# Patient Record
Sex: MALE | Race: WHITE | NOT HISPANIC OR LATINO | URBAN - METROPOLITAN AREA
[De-identification: names, ages, dates, MRNs, and addresses within clinical notes are randomized per-mention and may not be internally consistent; named-entity substitution may affect disease eponyms.]

---

## 2023-08-30 RX ORDER — LOSARTAN POTASSIUM 100 MG/1
1 TABLET ORAL DAILY
COMMUNITY
Start: 2020-10-01 | End: 2024-02-27 | Stop reason: SDUPTHER

## 2023-08-30 RX ORDER — ALBUTEROL SULFATE 90 UG/1
AEROSOL, METERED RESPIRATORY (INHALATION)
COMMUNITY
Start: 2022-07-26 | End: 2024-03-26 | Stop reason: ALTCHOICE

## 2023-08-30 RX ORDER — OMEPRAZOLE 40 MG/1
1 CAPSULE, DELAYED RELEASE ORAL DAILY
COMMUNITY
Start: 2020-01-29 | End: 2024-03-26 | Stop reason: ALTCHOICE

## 2023-08-30 RX ORDER — ATENOLOL 25 MG/1
25 TABLET ORAL DAILY
COMMUNITY
End: 2024-02-27 | Stop reason: SDUPTHER

## 2023-08-30 RX ORDER — FLUTICASONE PROPIONATE 50 MCG
2 SPRAY, SUSPENSION (ML) NASAL DAILY
COMMUNITY
Start: 2022-07-26 | End: 2024-03-26 | Stop reason: ALTCHOICE

## 2023-08-30 RX ORDER — SERTRALINE HYDROCHLORIDE 100 MG/1
150 TABLET, FILM COATED ORAL DAILY
COMMUNITY
End: 2024-03-26 | Stop reason: SDUPTHER

## 2023-08-30 RX ORDER — CETIRIZINE HYDROCHLORIDE 10 MG/1
1 TABLET ORAL DAILY
COMMUNITY
End: 2024-03-26 | Stop reason: ALTCHOICE

## 2023-09-25 RX ORDER — SERTRALINE HYDROCHLORIDE 100 MG/1
150 TABLET, FILM COATED ORAL DAILY
Qty: 135 TABLET | Refills: 3 | OUTPATIENT
Start: 2023-09-25

## 2024-02-17 DIAGNOSIS — I10 PRIMARY HYPERTENSION: ICD-10-CM

## 2024-02-19 RX ORDER — ATENOLOL 25 MG/1
25 TABLET ORAL DAILY
Qty: 30 TABLET | Refills: 0 | OUTPATIENT
Start: 2024-02-19

## 2024-02-19 RX ORDER — LOSARTAN POTASSIUM 100 MG/1
100 TABLET ORAL DAILY
Qty: 30 TABLET | Refills: 0 | OUTPATIENT
Start: 2024-02-19

## 2024-02-27 ENCOUNTER — OFFICE VISIT (OUTPATIENT)
Dept: PRIMARY CARE | Facility: CLINIC | Age: 49
End: 2024-02-27
Payer: COMMERCIAL

## 2024-02-27 VITALS
TEMPERATURE: 97.7 F | BODY MASS INDEX: 34 KG/M2 | DIASTOLIC BLOOD PRESSURE: 89 MMHG | HEART RATE: 100 BPM | SYSTOLIC BLOOD PRESSURE: 135 MMHG | HEIGHT: 73 IN | WEIGHT: 256.5 LBS

## 2024-02-27 DIAGNOSIS — J06.9 VIRAL URI WITH COUGH: Primary | ICD-10-CM

## 2024-02-27 PROBLEM — J32.9 CHRONIC SINUSITIS: Status: ACTIVE | Noted: 2024-02-27

## 2024-02-27 PROBLEM — K58.9 IBS (IRRITABLE BOWEL SYNDROME): Status: ACTIVE | Noted: 2024-02-27

## 2024-02-27 PROBLEM — F41.8 DEPRESSION WITH ANXIETY: Status: ACTIVE | Noted: 2024-02-27

## 2024-02-27 PROBLEM — J30.2 SEASONAL ALLERGIES: Status: ACTIVE | Noted: 2024-02-27

## 2024-02-27 PROBLEM — J45.990 EXERCISE-INDUCED ASTHMA (HHS-HCC): Status: ACTIVE | Noted: 2024-02-27

## 2024-02-27 PROBLEM — K21.9 GERD (GASTROESOPHAGEAL REFLUX DISEASE): Status: ACTIVE | Noted: 2024-02-27

## 2024-02-27 PROBLEM — L70.9 ACNE: Status: ACTIVE | Noted: 2024-02-27

## 2024-02-27 PROBLEM — J20.9 ACUTE BRONCHITIS: Status: RESOLVED | Noted: 2024-02-27 | Resolved: 2024-02-27

## 2024-02-27 PROBLEM — I10 HTN (HYPERTENSION): Status: ACTIVE | Noted: 2024-02-27

## 2024-02-27 PROBLEM — E78.5 HLD (HYPERLIPIDEMIA): Status: ACTIVE | Noted: 2024-02-27

## 2024-02-27 PROBLEM — J02.9 SORE THROAT: Status: RESOLVED | Noted: 2024-02-27 | Resolved: 2024-02-27

## 2024-02-27 PROBLEM — G47.33 OBSTRUCTIVE SLEEP APNEA SYNDROME: Status: ACTIVE | Noted: 2019-01-29

## 2024-02-27 PROCEDURE — 3075F SYST BP GE 130 - 139MM HG: CPT

## 2024-02-27 PROCEDURE — 3079F DIAST BP 80-89 MM HG: CPT

## 2024-02-27 PROCEDURE — 99213 OFFICE O/P EST LOW 20 MIN: CPT

## 2024-02-27 PROCEDURE — 1036F TOBACCO NON-USER: CPT

## 2024-02-27 NOTE — PROGRESS NOTES
"Subjective   Andre Lazaro is a 48 y.o. male who presents for Follow-up (Was dx with Flu beginning of Feb at ER/Still not feeling well/Started a zpak for sinus infx for 10days after the flu dx).  Finished azithromycin about a week ago. Residual congestion, productive cough, yellow to clear drainage. He did just return on a flight from [place].         ROS negative unless otherwise stated in HPI.     /89   Pulse 100   Temp 36.5 °C (97.7 °F)   Ht 1.854 m (6' 1\")   Wt 116 kg (256 lb 8 oz)   BMI 33.84 kg/m²    Objective        Physical Exam  Constitutional:       Appearance: Normal appearance.   HENT:      Head: Normocephalic.      Right Ear: Tympanic membrane and ear canal normal. There is no impacted cerumen.      Left Ear: Tympanic membrane and ear canal normal. There is no impacted cerumen.      Nose: Rhinorrhea present.      Comments: Erythematous nares bilateral     Mouth/Throat:      Mouth: Mucous membranes are moist.      Pharynx: Oropharynx is clear. No oropharyngeal exudate or posterior oropharyngeal erythema.   Eyes:      Conjunctiva/sclera: Conjunctivae normal.   Cardiovascular:      Rate and Rhythm: Normal rate and regular rhythm.   Pulmonary:      Effort: Pulmonary effort is normal.      Breath sounds: Normal breath sounds.   Musculoskeletal:      Cervical back: Neck supple.   Skin:     General: Skin is warm and dry.   Neurological:      Mental Status: He is alert and oriented to person, place, and time.   Psychiatric:         Mood and Affect: Mood normal.         Assessment/Plan   Continue with OTC support please make sure you use the products labeled safe for high blood pressure use. Ask pharmacist if you need assistance.   Call back to the office if your symptoms are worsening.   Problem List Items Addressed This Visit    None  Visit Diagnoses       Viral URI with cough    -  Primary               "

## 2024-03-06 DIAGNOSIS — I10 PRIMARY HYPERTENSION: ICD-10-CM

## 2024-03-07 RX ORDER — SERTRALINE HYDROCHLORIDE 100 MG/1
150 TABLET, FILM COATED ORAL DAILY
Qty: 135 TABLET | Refills: 3 | OUTPATIENT
Start: 2024-03-07

## 2024-03-07 RX ORDER — ATENOLOL 25 MG/1
25 TABLET ORAL DAILY
Qty: 90 TABLET | Refills: 3 | OUTPATIENT
Start: 2024-03-07

## 2024-03-07 RX ORDER — LOSARTAN POTASSIUM 100 MG/1
100 TABLET ORAL DAILY
Qty: 90 TABLET | Refills: 3 | OUTPATIENT
Start: 2024-03-07

## 2024-03-26 ENCOUNTER — OFFICE VISIT (OUTPATIENT)
Dept: PRIMARY CARE | Facility: CLINIC | Age: 49
End: 2024-03-26
Payer: COMMERCIAL

## 2024-03-26 VITALS
HEART RATE: 82 BPM | DIASTOLIC BLOOD PRESSURE: 70 MMHG | RESPIRATION RATE: 20 BRPM | BODY MASS INDEX: 33.8 KG/M2 | SYSTOLIC BLOOD PRESSURE: 118 MMHG | TEMPERATURE: 98 F | WEIGHT: 255 LBS | HEIGHT: 73 IN

## 2024-03-26 DIAGNOSIS — J20.9 ACUTE BRONCHITIS, UNSPECIFIED ORGANISM: ICD-10-CM

## 2024-03-26 DIAGNOSIS — F41.8 DEPRESSION WITH ANXIETY: ICD-10-CM

## 2024-03-26 DIAGNOSIS — E78.2 MIXED HYPERLIPIDEMIA: ICD-10-CM

## 2024-03-26 DIAGNOSIS — I10 PRIMARY HYPERTENSION: ICD-10-CM

## 2024-03-26 DIAGNOSIS — Z00.00 ENCOUNTER FOR PREVENTIVE HEALTH EXAMINATION: ICD-10-CM

## 2024-03-26 DIAGNOSIS — Z12.11 SCREENING FOR MALIGNANT NEOPLASM OF COLON: Primary | ICD-10-CM

## 2024-03-26 PROCEDURE — 3074F SYST BP LT 130 MM HG: CPT | Performed by: FAMILY MEDICINE

## 2024-03-26 PROCEDURE — 99214 OFFICE O/P EST MOD 30 MIN: CPT | Performed by: FAMILY MEDICINE

## 2024-03-26 PROCEDURE — 1036F TOBACCO NON-USER: CPT | Performed by: FAMILY MEDICINE

## 2024-03-26 PROCEDURE — 3078F DIAST BP <80 MM HG: CPT | Performed by: FAMILY MEDICINE

## 2024-03-26 PROCEDURE — 99396 PREV VISIT EST AGE 40-64: CPT | Performed by: FAMILY MEDICINE

## 2024-03-26 RX ORDER — SERTRALINE HYDROCHLORIDE 100 MG/1
150 TABLET, FILM COATED ORAL DAILY
Qty: 135 TABLET | Refills: 1 | Status: SHIPPED | OUTPATIENT
Start: 2024-03-26

## 2024-03-26 RX ORDER — ALBUTEROL SULFATE 90 UG/1
2 AEROSOL, METERED RESPIRATORY (INHALATION) EVERY 6 HOURS PRN
Qty: 18 G | Refills: 1 | Status: SHIPPED | OUTPATIENT
Start: 2024-03-26

## 2024-03-26 RX ORDER — LOSARTAN POTASSIUM 100 MG/1
100 TABLET ORAL DAILY
Qty: 90 TABLET | Refills: 1 | Status: SHIPPED | OUTPATIENT
Start: 2024-03-26

## 2024-03-26 RX ORDER — ATENOLOL 25 MG/1
25 TABLET ORAL DAILY
Qty: 90 TABLET | Refills: 1 | Status: SHIPPED | OUTPATIENT
Start: 2024-03-26

## 2024-03-26 NOTE — PROGRESS NOTES
Andre Lazaro is a 48 y.o. male here today a periodic health exam.  I reviewed previous preventative health measures including screening tests, immunizations and labs.      HPI   CURRENT COMPLAINTS OR CONCERNS:    He was dxed with sinusitis and is on Augmentin now.  Feeling much better.  But still with cough and some wheeze.  No fever or SOB.      PREVIOUS PREVENTATIVE HEALTH  PSA :  NO  Colonoscopy : at 39 years old.  No FH of colon cancer.    Cologuard :  NO  Hepatitis C Antibody :  NO  HIV Screening : NO  Prevnar/Pneumovax : NO  Tdap : NO   Shingrix : NO  Yearly Flu Shot :  NO    CURRENT FINDINGS  Healthy Diet : YES  Exercise :  NO  Depression or Anxiety Issues : NO - he ran out of sertraline a few days ago.  Alcohol Use : YES --  12 per week.  Tobacco Use : NO  Drug Use : NO    HTN recheck -- Patient denies chest pain, SOB, edema, palpitations on review.  Taking medication correctly and denies any side effects.       Mood disorder recheck -- Patient feels like condition is well controlled.  Has good control of mood and emotional reactions.  No SE's or problems with medications.  No homicidal or suicidal ideation.  Patient wishes to continue same medications.      Review of medical records shows that I have not seen this patient for about 14 months yet he says he still has blood pressure medicines and just ran out of sertraline a few days ago.    Current Outpatient Medications:     albuterol 90 mcg/actuation inhaler, Inhale 2 puffs every 6 hours if needed for wheezing., Disp: 18 g, Rfl: 1    atenolol (Tenormin) 25 mg tablet, Take 1 tablet (25 mg) by mouth once daily., Disp: 90 tablet, Rfl: 1    losartan (Cozaar) 100 mg tablet, Take 1 tablet (100 mg) by mouth once daily., Disp: 90 tablet, Rfl: 1    sertraline (Zoloft) 100 mg tablet, Take 1.5 tablets (150 mg) by mouth once daily., Disp: 135 tablet, Rfl: 1    Patient Active Problem List   Diagnosis    Chronic sinusitis    Depression with anxiety     Exercise-induced asthma    GERD (gastroesophageal reflux disease)    Mixed hyperlipidemia    Primary hypertension    IBS (irritable bowel syndrome)    Obstructive sleep apnea syndrome    Seasonal allergies    Acne         Past Surgical History:   Procedure Laterality Date    OTHER SURGICAL HISTORY  09/30/2019    Nasal septoplasty       No family history on file.    Social History     Tobacco Use    Smoking status: Never    Smokeless tobacco: Never   Vaping Use    Vaping Use: Never used   Substance Use Topics    Alcohol use: Yes     Comment: moderately    Drug use: Never       Immunization History   Administered Date(s) Administered    Pfizer Purple Cap SARS-CoV-2 04/05/2021, 04/26/2021, 01/05/2022         Objective    Visit Vitals  /70   Pulse 82   Temp 36.7 °C (98 °F)   Resp 20       Physical Exam  Vitals and nursing note reviewed.   Constitutional:       General: He is not in acute distress.     Appearance: Normal appearance.   HENT:      Head: Normocephalic and atraumatic.      Right Ear: Tympanic membrane, ear canal and external ear normal.      Left Ear: Tympanic membrane, ear canal and external ear normal.      Nose: Nose normal.      Mouth/Throat:      Mouth: Mucous membranes are moist.      Pharynx: Oropharynx is clear.   Eyes:      Extraocular Movements: Extraocular movements intact.      Conjunctiva/sclera: Conjunctivae normal.      Pupils: Pupils are equal, round, and reactive to light.   Cardiovascular:      Rate and Rhythm: Normal rate and regular rhythm.      Pulses: Normal pulses.      Heart sounds: Normal heart sounds. No murmur heard.     No friction rub. No gallop.   Pulmonary:      Effort: Pulmonary effort is normal. No respiratory distress.      Breath sounds: Normal breath sounds.   Abdominal:      General: Abdomen is flat. Bowel sounds are normal. There is no distension.      Palpations: Abdomen is soft.      Tenderness: There is no abdominal tenderness.   Musculoskeletal:          General: Normal range of motion.      Cervical back: Normal range of motion and neck supple.   Lymphadenopathy:      Cervical: No cervical adenopathy.   Skin:     General: Skin is warm and dry.      Findings: No lesion or rash.   Neurological:      General: No focal deficit present.      Mental Status: He is alert. Mental status is at baseline.   Psychiatric:         Mood and Affect: Mood normal.         Behavior: Behavior normal.         Thought Content: Thought content normal.         Judgment: Judgment normal.            Assessment    1. Screening for malignant neoplasm of colon  Cologuard® colon cancer screening, Cologuard® colon cancer screening      2. Acute bronchitis, unspecified organism  albuterol 90 mcg/actuation inhaler   He still has a cough and some wheezing from recent bronchitis and I will send in albuterol to use as needed.  Call or RTO if symptoms worsen or don't fully resolve.  Increase fluid intake.  Rest.  May use OTC symptomatic medications as needed at the appropriate dose.     3. Primary hypertension  Comprehensive Metabolic Panel, CBC, Lipid Panel, losartan (Cozaar) 100 mg tablet, atenolol (Tenormin) 25 mg tablet   Condition well controlled.  No change in current treatment regimen.  Refill given of current medication.  Appropriate labs ordered or reviewed.  Make a follow up appointment with me for recheck in 6 months.       4. Encounter for preventive health examination  Comprehensive Metabolic Panel, CBC, Lipid Panel, Hepatitis C Antibody, HIV 1/2 Antigen/Antibody Screen with Reflex to Confirmation   Recommend regular exercise, balanced diet, regular dental exams, and healthy habits.  Appropriate labs ordered or reviewed.  He is due for a tetanus booster but we chose to wait since he is recovering from a recent bronchitis.  I told him to get this in the next few months either at this office or at the pharmacy.  I ordered Cologuard.  I recommend to eat plenty of plant foods (such as  whole-grain products, fruits, and vegetables) and a moderate amount of lean and low-fat, animal-based food (meat and dairy products).  When shopping, choose lean meats, fish, and poultry. I recommend to increase aerobic exercise.  I recommend a yearly flu shot in the fall and I recommend a yearly wellness exam.         5. Depression with anxiety  sertraline (Zoloft) 100 mg tablet   Condition well controlled.  No change in current treatment regimen.  Refill given of current medication.  Make a follow up appointment with me for recheck in 6 months.       6. Mixed hyperlipidemia  Lipid Panel   Appropriate labs ordered or reviewed.        Orders Placed This Encounter      albuterol 90 mcg/actuation inhaler      atenolol (Tenormin) 25 mg tablet      losartan (Cozaar) 100 mg tablet      sertraline (Zoloft) 100 mg tablet         Orders Placed This Encounter   Procedures    Comprehensive Metabolic Panel    CBC    Lipid Panel    Hepatitis C Antibody    HIV 1/2 Antigen/Antibody Screen with Reflex to Confirmation    Cologuard® colon cancer screening        New Medications Ordered This Visit   Medications    albuterol 90 mcg/actuation inhaler     Sig: Inhale 2 puffs every 6 hours if needed for wheezing.     Dispense:  18 g     Refill:  1    losartan (Cozaar) 100 mg tablet     Sig: Take 1 tablet (100 mg) by mouth once daily.     Dispense:  90 tablet     Refill:  1    atenolol (Tenormin) 25 mg tablet     Sig: Take 1 tablet (25 mg) by mouth once daily.     Dispense:  90 tablet     Refill:  1    sertraline (Zoloft) 100 mg tablet     Sig: Take 1.5 tablets (150 mg) by mouth once daily.     Dispense:  135 tablet     Refill:  1

## 2024-04-23 LAB — NONINV COLON CA DNA+OCC BLD SCRN STL QL: NORMAL

## 2024-08-27 ENCOUNTER — TELEPHONE (OUTPATIENT)
Dept: PRIMARY CARE | Facility: CLINIC | Age: 49
End: 2024-08-27
Payer: MEDICAID

## 2024-08-27 NOTE — TELEPHONE ENCOUNTER
Patient calling needs refills will be going out of town for a few weeks.      sertraline (Zoloft) 100 mg tablet    atenolol (Tenormin) 25 mg tablet

## 2024-09-03 DIAGNOSIS — F41.8 DEPRESSION WITH ANXIETY: ICD-10-CM

## 2024-09-03 RX ORDER — SERTRALINE HYDROCHLORIDE 100 MG/1
150 TABLET, FILM COATED ORAL DAILY
Qty: 45 TABLET | Refills: 0 | Status: SHIPPED | OUTPATIENT
Start: 2024-09-03

## 2024-09-03 NOTE — TELEPHONE ENCOUNTER
Patient called and is requesting a refill for Zoloft. He scheduled an appointment for the end of sept but was not happy stating he was just seen in March. On his chart you wrote a note stating do not fill medication unless at appointment. Please advise.

## 2024-09-19 ENCOUNTER — APPOINTMENT (OUTPATIENT)
Dept: PRIMARY CARE | Facility: CLINIC | Age: 49
End: 2024-09-19
Payer: MEDICAID

## 2024-10-03 ENCOUNTER — APPOINTMENT (OUTPATIENT)
Dept: PRIMARY CARE | Facility: CLINIC | Age: 49
End: 2024-10-03
Payer: MEDICAID

## 2024-10-03 VITALS
DIASTOLIC BLOOD PRESSURE: 88 MMHG | WEIGHT: 248 LBS | RESPIRATION RATE: 16 BRPM | BODY MASS INDEX: 32.87 KG/M2 | HEIGHT: 73 IN | SYSTOLIC BLOOD PRESSURE: 138 MMHG | HEART RATE: 68 BPM | TEMPERATURE: 97.3 F

## 2024-10-03 DIAGNOSIS — F41.8 DEPRESSION WITH ANXIETY: ICD-10-CM

## 2024-10-03 DIAGNOSIS — I10 PRIMARY HYPERTENSION: ICD-10-CM

## 2024-10-03 PROCEDURE — 99214 OFFICE O/P EST MOD 30 MIN: CPT | Performed by: FAMILY MEDICINE

## 2024-10-03 PROCEDURE — 3075F SYST BP GE 130 - 139MM HG: CPT | Performed by: FAMILY MEDICINE

## 2024-10-03 PROCEDURE — 3008F BODY MASS INDEX DOCD: CPT | Performed by: FAMILY MEDICINE

## 2024-10-03 PROCEDURE — 3079F DIAST BP 80-89 MM HG: CPT | Performed by: FAMILY MEDICINE

## 2024-10-03 RX ORDER — SERTRALINE HYDROCHLORIDE 100 MG/1
150 TABLET, FILM COATED ORAL DAILY
Qty: 135 TABLET | Refills: 1 | Status: SHIPPED | OUTPATIENT
Start: 2024-10-03

## 2024-10-03 RX ORDER — ATENOLOL 25 MG/1
25 TABLET ORAL DAILY
Qty: 90 TABLET | Refills: 1 | Status: SHIPPED | OUTPATIENT
Start: 2024-10-03

## 2024-10-03 RX ORDER — LOSARTAN POTASSIUM 100 MG/1
100 TABLET ORAL DAILY
Qty: 90 TABLET | Refills: 1 | Status: SHIPPED | OUTPATIENT
Start: 2024-10-03

## 2024-10-03 NOTE — PROGRESS NOTES
"Andre Lazaro is a 49 y.o. male here today for   Chief Complaint   Patient presents with    Depression    Anxiety    Hypertension        HPI     HTN recheck -- Patient denies chest pain, SOB, edema, palpitations on review.  Taking medication correctly and denies any side effects. Not checking at home.      Mood disorder recheck -- Patient feels like condition is well controlled.  Has good control of mood and emotional reactions.  No SE's or problems with medications.  No homicidal or suicidal ideation.  Patient wishes to continue same medications.  No breakthrough sxs at all.      He had insurance change so he is overdue for recheck.      Current Outpatient Medications:     albuterol 90 mcg/actuation inhaler, Inhale 2 puffs every 6 hours if needed for wheezing., Disp: 18 g, Rfl: 1    atenolol (Tenormin) 25 mg tablet, Take 1 tablet (25 mg) by mouth once daily., Disp: 90 tablet, Rfl: 1    losartan (Cozaar) 100 mg tablet, Take 1 tablet (100 mg) by mouth once daily., Disp: 90 tablet, Rfl: 1    sertraline (Zoloft) 100 mg tablet, Take 1.5 tablets (150 mg) by mouth once daily., Disp: 135 tablet, Rfl: 1    Patient Active Problem List   Diagnosis    Chronic sinusitis    Depression with anxiety    Exercise-induced asthma ([facility])    GERD (gastroesophageal reflux disease)    Mixed hyperlipidemia    Primary hypertension    IBS (irritable bowel syndrome)    Obstructive sleep apnea syndrome    Seasonal allergies    Acne         No results found for this or any previous visit (from the past 672 hour(s)).     Objective    Visit Vitals    Visit Vitals  /88   Pulse 68   Temp 36.3 °C (97.3 °F)   Resp 16   Ht 1.854 m (6' 1\")   Wt 112 kg (248 lb)   BMI 32.72 kg/m²   Smoking Status Never   BSA 2.4 m²       Body mass index is 32.72 kg/m².     Physical Exam   General - Not in acute distress and cooperative.  Build & Nutrition - Well developed  Posture - Normal  Gait - Normal  Mental Status - alert and oriented x 3    Head - " Normocephalic    Neck - Thyroid normal size    Eyes - Bilateral - Sclera clear and lids pink without edema or mass.      Skin - Warm and dry with no rashes on visible skin    Lungs - Clear to auscultation and normal breathing effort    Cardiovascular - RRR and no murmurs, rubs or thrill.    Peripheral Vascular - Bilateral - no edema present    Neuropsychiatric - normal mood and affect          Assessment & Plan  Primary hypertension  Condition well controlled.  No change in current treatment regimen.  Refill given of current medication.  Appropriate labs ordered or reviewed.  Make a follow up appointment with me for recheck in 6 months.      Orders:    atenolol (Tenormin) 25 mg tablet; Take 1 tablet (25 mg) by mouth once daily.    losartan (Cozaar) 100 mg tablet; Take 1 tablet (100 mg) by mouth once daily.    Comprehensive Metabolic Panel; Future    CBC; Future    Lipid Panel; Future    Depression with anxiety  Condition well controlled.  No change in current treatment regimen.  Refill given of current medication.  Make a follow up appointment with me for recheck in 6 months.      Orders:    sertraline (Zoloft) 100 mg tablet; Take 1.5 tablets (150 mg) by mouth once daily.         Orders Placed This Encounter      atenolol (Tenormin) 25 mg tablet      losartan (Cozaar) 100 mg tablet      sertraline (Zoloft) 100 mg tablet       Orders Placed This Encounter   Procedures    Comprehensive Metabolic Panel    CBC    Lipid Panel        New Medications Ordered This Visit   Medications    atenolol (Tenormin) 25 mg tablet     Sig: Take 1 tablet (25 mg) by mouth once daily.     Dispense:  90 tablet     Refill:  1    losartan (Cozaar) 100 mg tablet     Sig: Take 1 tablet (100 mg) by mouth once daily.     Dispense:  90 tablet     Refill:  1    sertraline (Zoloft) 100 mg tablet     Sig: Take 1.5 tablets (150 mg) by mouth once daily.     Dispense:  135 tablet     Refill:  1

## 2024-10-03 NOTE — ASSESSMENT & PLAN NOTE
Condition well controlled.  No change in current treatment regimen.  Refill given of current medication.  Appropriate labs ordered or reviewed.  Make a follow up appointment with me for recheck in 6 months.      Orders:    atenolol (Tenormin) 25 mg tablet; Take 1 tablet (25 mg) by mouth once daily.    losartan (Cozaar) 100 mg tablet; Take 1 tablet (100 mg) by mouth once daily.    Comprehensive Metabolic Panel; Future    CBC; Future    Lipid Panel; Future

## 2024-10-03 NOTE — ASSESSMENT & PLAN NOTE
Condition well controlled.  No change in current treatment regimen.  Refill given of current medication.  Make a follow up appointment with me for recheck in 6 months.      Orders:    sertraline (Zoloft) 100 mg tablet; Take 1.5 tablets (150 mg) by mouth once daily.

## 2024-11-04 DIAGNOSIS — F41.8 DEPRESSION WITH ANXIETY: ICD-10-CM

## 2024-11-04 NOTE — TELEPHONE ENCOUNTER
Patient called and stated he picked up his sertraline #135 tabs last month and he can not find the bottle. He is asking for a refill of this medication. Please advise

## 2024-11-06 RX ORDER — SERTRALINE HYDROCHLORIDE 100 MG/1
150 TABLET, FILM COATED ORAL DAILY
Qty: 135 TABLET | Refills: 0 | Status: SHIPPED | OUTPATIENT
Start: 2024-11-06

## 2024-12-23 ENCOUNTER — TELEPHONE (OUTPATIENT)
Dept: PRIMARY CARE | Facility: CLINIC | Age: 49
End: 2024-12-23
Payer: MEDICAID

## 2024-12-23 NOTE — TELEPHONE ENCOUNTER
While getting ready to send refill msg I seen that Risjayna sent in a 90 day supply in October with 1 refill so he should have a refill left on it.    I messaged patient thru mychart to let him know and that he could call thepharmacy for the refill

## 2024-12-23 NOTE — TELEPHONE ENCOUNTER
Next office visit with [facility]: 4/7/25. Pt is out of Losartan, pt has been out for a day. Pt req refill. Please advice.

## 2025-01-08 ENCOUNTER — OFFICE VISIT (OUTPATIENT)
Dept: PRIMARY CARE | Facility: CLINIC | Age: 50
End: 2025-01-08
Payer: MEDICAID

## 2025-01-08 VITALS
SYSTOLIC BLOOD PRESSURE: 118 MMHG | TEMPERATURE: 97.1 F | HEIGHT: 73 IN | HEART RATE: 72 BPM | RESPIRATION RATE: 20 BRPM | DIASTOLIC BLOOD PRESSURE: 78 MMHG | BODY MASS INDEX: 33.8 KG/M2 | WEIGHT: 255 LBS

## 2025-01-08 DIAGNOSIS — J32.9 SINOBRONCHITIS: Primary | ICD-10-CM

## 2025-01-08 DIAGNOSIS — G47.33 OBSTRUCTIVE SLEEP APNEA SYNDROME: ICD-10-CM

## 2025-01-08 DIAGNOSIS — J40 SINOBRONCHITIS: Primary | ICD-10-CM

## 2025-01-08 PROCEDURE — 3078F DIAST BP <80 MM HG: CPT | Performed by: FAMILY MEDICINE

## 2025-01-08 PROCEDURE — 3074F SYST BP LT 130 MM HG: CPT | Performed by: FAMILY MEDICINE

## 2025-01-08 PROCEDURE — 1036F TOBACCO NON-USER: CPT | Performed by: FAMILY MEDICINE

## 2025-01-08 PROCEDURE — 3008F BODY MASS INDEX DOCD: CPT | Performed by: FAMILY MEDICINE

## 2025-01-08 PROCEDURE — 99214 OFFICE O/P EST MOD 30 MIN: CPT | Performed by: FAMILY MEDICINE

## 2025-01-08 NOTE — ASSESSMENT & PLAN NOTE
Patient I discussed Zepbound briefly and how it works.  I recommend that he check with his insurance to see if it is covered and for what conditions.  If it is covered he would like to make an appointment to come in and discuss this further and possibly start the medication.  There is no past medical history or family history of thyroid or pancreatic cancer.

## 2025-01-08 NOTE — PROGRESS NOTES
"Andre Lazaro is a 49 y.o. male here today for   Chief Complaint   Patient presents with    Follow-up        HPI     Went to an UC one week ago and given Zpak for bronchitis.  He is doing much better now.  He finished the antibiotic 2 days ago.  Still with cough and chest congestion but definitely improving.  No fever/chills.  All tests were negative.  I have no records.  CXR showed bronchitis according to the patient.      He is also interested in discussing Zepbound.  He has a history obstructive sleep apnea and this was just approved for treatment of sleep apnea.  He also has a history of moderate obesity with a BMI of 33.64 today.  He has no history of diabetes or prediabetes.  He has not checked with his insurance to see if they cover this medication.    Current Outpatient Medications:     atenolol (Tenormin) 25 mg tablet, Take 1 tablet (25 mg) by mouth once daily., Disp: 90 tablet, Rfl: 1    losartan (Cozaar) 100 mg tablet, Take 1 tablet (100 mg) by mouth once daily., Disp: 90 tablet, Rfl: 1    sertraline (Zoloft) 100 mg tablet, Take 1.5 tablets (150 mg) by mouth once daily., Disp: 135 tablet, Rfl: 0    Patient Active Problem List   Diagnosis    Chronic sinusitis    Depression with anxiety    Exercise-induced asthma ([facility])    GERD (gastroesophageal reflux disease)    Mixed hyperlipidemia    Primary hypertension    IBS (irritable bowel syndrome)    Obstructive sleep apnea syndrome    Seasonal allergies    Acne         Objective    Visit Vitals    Visit Vitals  Temp 36.2 °C (97.1 °F)   Resp 20   Ht 1.854 m (6' 1\")   Wt 116 kg (255 lb)   BMI 33.64 kg/m²   Smoking Status Never   BSA 2.44 m²       Body mass index is 33.64 kg/m².     Physical Exam   General -  Cooperative, Not in acute distress.    Skin - Warm and dry with no rashes.    Eye - Bilateral - pupils equal and round, sclera clear and lids pink without edema or mass.  Sclera and conjunctiva - bilateral - Normal.    ENMT - Left -TM pearly grey with " good light reflex and externa auditory canal pink and dry.              Right -TM pearly grey with good light relflex and external auditory canal pink and dry.    Oropharynx - moist and pink, tonsils normal, no erythema noted.    Nose  - Nasal mucosa - no purulent discharge.    Sinuses -- Frontal - no tenderness observed.  Maxillary - no tenderness observed.  Ethmoid - no tenderness observed.    Lungs - Clear to auscultation and normal breathing effort.  Even and easy respiratory effort with no use of accessory muscles.    Heart -- RRR and no murmurs, rubs or thrill    Lymphatic - Cervical nodes normal with no enlargement.      Assessment & Plan  Sinobronchitis  Patient just recently finished azithromycin as above.  His symptoms have improved but he still has some congestion and cough.  There is no evidence of a continued bacterial infection and I recommend he give it more time and treat the symptoms.  If his symptoms do not fully clear in another 10 days he can call us and I will send in another Z-Alejo.       Obstructive sleep apnea syndrome  Patient I discussed Zepbound briefly and how it works.  I recommend that he check with his insurance to see if it is covered and for what conditions.  If it is covered he would like to make an appointment to come in and discuss this further and possibly start the medication.  There is no past medical history or family history of thyroid or pancreatic cancer.                  No orders of the defined types were placed in this encounter.       No orders of the defined types were placed in this encounter.

## 2025-01-14 ENCOUNTER — TELEPHONE (OUTPATIENT)
Dept: PRIMARY CARE | Facility: CLINIC | Age: 50
End: 2025-01-14
Payer: MEDICAID

## 2025-01-14 DIAGNOSIS — J32.9 CHRONIC SINUSITIS, UNSPECIFIED LOCATION: Primary | ICD-10-CM

## 2025-01-14 RX ORDER — AZITHROMYCIN 250 MG/1
TABLET, FILM COATED ORAL
Qty: 6 TABLET | Refills: 0 | Status: SHIPPED | OUTPATIENT
Start: 2025-01-14 | End: 2025-01-18

## 2025-01-14 NOTE — TELEPHONE ENCOUNTER
[facility]    Requesting 2nd round of zpack.  Was informed by Dr Wong that if he wasn't feeling better, he can just call up here and get another refill.  Please advise and call pt when complete, so he knows the status.

## 2025-01-22 ENCOUNTER — LAB (OUTPATIENT)
Dept: LAB | Facility: LAB | Age: 50
End: 2025-01-22
Payer: MEDICAID

## 2025-01-22 DIAGNOSIS — Z00.00 ENCOUNTER FOR PREVENTIVE HEALTH EXAMINATION: ICD-10-CM

## 2025-01-22 DIAGNOSIS — E78.2 MIXED HYPERLIPIDEMIA: ICD-10-CM

## 2025-01-22 DIAGNOSIS — I10 PRIMARY HYPERTENSION: ICD-10-CM

## 2025-01-22 LAB
ERYTHROCYTE [DISTWIDTH] IN BLOOD BY AUTOMATED COUNT: 12.3 % (ref 11.5–14.5)
HCT VFR BLD AUTO: 44.4 % (ref 41–52)
HGB BLD-MCNC: 15.2 G/DL (ref 13.5–17.5)
MCH RBC QN AUTO: 32.4 PG (ref 26–34)
MCHC RBC AUTO-ENTMCNC: 34.2 G/DL (ref 32–36)
MCV RBC AUTO: 95 FL (ref 80–100)
NRBC BLD-RTO: 0 /100 WBCS (ref 0–0)
PLATELET # BLD AUTO: 240 X10*3/UL (ref 150–450)
RBC # BLD AUTO: 4.69 X10*6/UL (ref 4.5–5.9)
WBC # BLD AUTO: 7.3 X10*3/UL (ref 4.4–11.3)

## 2025-01-22 PROCEDURE — 85027 COMPLETE CBC AUTOMATED: CPT

## 2025-01-22 PROCEDURE — 80053 COMPREHEN METABOLIC PANEL: CPT

## 2025-01-22 PROCEDURE — 86803 HEPATITIS C AB TEST: CPT

## 2025-01-22 PROCEDURE — 87389 HIV-1 AG W/HIV-1&-2 AB AG IA: CPT

## 2025-01-22 PROCEDURE — 80061 LIPID PANEL: CPT

## 2025-01-23 ENCOUNTER — TELEPHONE (OUTPATIENT)
Dept: PRIMARY CARE | Facility: CLINIC | Age: 50
End: 2025-01-23
Payer: MEDICAID

## 2025-01-23 LAB
ALBUMIN SERPL BCP-MCNC: 4.7 G/DL (ref 3.4–5)
ALP SERPL-CCNC: 77 U/L (ref 33–120)
ALT SERPL W P-5'-P-CCNC: 24 U/L (ref 10–52)
ANION GAP SERPL CALC-SCNC: 14 MMOL/L (ref 10–20)
AST SERPL W P-5'-P-CCNC: 15 U/L (ref 9–39)
BILIRUB SERPL-MCNC: 0.5 MG/DL (ref 0–1.2)
BUN SERPL-MCNC: 17 MG/DL (ref 6–23)
CALCIUM SERPL-MCNC: 9.4 MG/DL (ref 8.6–10.3)
CHLORIDE SERPL-SCNC: 103 MMOL/L (ref 98–107)
CHOLEST SERPL-MCNC: 260 MG/DL (ref 0–199)
CHOLESTEROL/HDL RATIO: 3.9
CO2 SERPL-SCNC: 27 MMOL/L (ref 21–32)
CREAT SERPL-MCNC: 1.08 MG/DL (ref 0.5–1.3)
EGFRCR SERPLBLD CKD-EPI 2021: 84 ML/MIN/1.73M*2
GLUCOSE SERPL-MCNC: 91 MG/DL (ref 74–99)
HCV AB SER QL: NONREACTIVE
HDLC SERPL-MCNC: 67.4 MG/DL
HIV 1+2 AB+HIV1 P24 AG SERPL QL IA: NONREACTIVE
LDLC SERPL CALC-MCNC: 168 MG/DL
NON HDL CHOLESTEROL: 193 MG/DL (ref 0–149)
POTASSIUM SERPL-SCNC: 4.9 MMOL/L (ref 3.5–5.3)
PROT SERPL-MCNC: 7.1 G/DL (ref 6.4–8.2)
SODIUM SERPL-SCNC: 139 MMOL/L (ref 136–145)
TRIGL SERPL-MCNC: 123 MG/DL (ref 0–149)
VLDL: 25 MG/DL (ref 0–40)

## 2025-01-23 NOTE — TELEPHONE ENCOUNTER
Pt would like rx for Zepbound. He said he talked about this with Dr. Wong at his last office visit. He said it can be used for sleep apnea. It will probably need a prior auth. Send to local pharm.  Pt aware Dr. Wong is out of the office until the week of 1/27/2025.

## 2025-01-24 LAB — NONINV COLON CA DNA+OCC BLD SCRN STL QL: NEGATIVE

## 2025-02-06 ENCOUNTER — APPOINTMENT (OUTPATIENT)
Dept: PRIMARY CARE | Facility: CLINIC | Age: 50
End: 2025-02-06
Payer: MEDICAID

## 2025-02-17 ENCOUNTER — APPOINTMENT (OUTPATIENT)
Dept: PRIMARY CARE | Facility: CLINIC | Age: 50
End: 2025-02-17
Payer: MEDICAID

## 2025-02-17 VITALS
HEIGHT: 73 IN | HEART RATE: 96 BPM | BODY MASS INDEX: 33.8 KG/M2 | WEIGHT: 255 LBS | DIASTOLIC BLOOD PRESSURE: 78 MMHG | SYSTOLIC BLOOD PRESSURE: 118 MMHG | TEMPERATURE: 98 F | RESPIRATION RATE: 18 BRPM

## 2025-02-17 DIAGNOSIS — G47.33 OBSTRUCTIVE SLEEP APNEA SYNDROME: ICD-10-CM

## 2025-02-17 DIAGNOSIS — E66.811 OBESITY (BMI 30.0-34.9): Primary | ICD-10-CM

## 2025-02-17 DIAGNOSIS — I10 PRIMARY HYPERTENSION: ICD-10-CM

## 2025-02-17 DIAGNOSIS — M25.552 ACUTE HIP PAIN, LEFT: ICD-10-CM

## 2025-02-17 PROCEDURE — 99214 OFFICE O/P EST MOD 30 MIN: CPT | Performed by: FAMILY MEDICINE

## 2025-02-17 PROCEDURE — 3078F DIAST BP <80 MM HG: CPT | Performed by: FAMILY MEDICINE

## 2025-02-17 PROCEDURE — 3074F SYST BP LT 130 MM HG: CPT | Performed by: FAMILY MEDICINE

## 2025-02-17 PROCEDURE — 3008F BODY MASS INDEX DOCD: CPT | Performed by: FAMILY MEDICINE

## 2025-02-17 ASSESSMENT — ENCOUNTER SYMPTOMS: HIP PAIN: 1

## 2025-02-17 NOTE — ASSESSMENT & PLAN NOTE
Patient with a long history of obstructive sleep apnea and weight loss with tirzepatide will likely improve his sleep apnea over time.  Orders:    tirzepatide, weight loss, (Zepbound) 2.5 mg/0.5 mL injection; Inject 2.5 mg under the skin every 7 days.

## 2025-02-17 NOTE — PROGRESS NOTES
"Andre Lazaro is a 49 y.o. male here today for   Chief Complaint   Patient presents with    Weight Management     Wants to discuss Zepbound    Hip Pain        Hip Pain   The incident occurred more than 1 week ago. The injury mechanism is unknown. The pain is present in the left hip, left thigh and left leg.      Patient is interested in trying Zepbound for obesity.  He called insurance and was told it may be covered for weight loss.  He has a long history of obesity and has tried numerous diets and exercise programs without any lasting success.  There is no family history or past medical history of thyroid cancer or pancreatic cancer.  He has no history of diabetes or prediabetes on recent labs.    Hip pain since playing golf 2 weeks ago.  Over left lateral hip and down anterior thigh.  Very bad pain at times.  No back pain.  Excrutiating pain with movement at times.  He says that he had no pain while playing golf but the pain started about 2 hours later.  There is no bruising or swelling.  He is walking with a bit of a limp.  There is no numbness or tingling or weakness.      Current Outpatient Medications:     atenolol (Tenormin) 25 mg tablet, Take 1 tablet (25 mg) by mouth once daily., Disp: 90 tablet, Rfl: 1    losartan (Cozaar) 100 mg tablet, Take 1 tablet (100 mg) by mouth once daily., Disp: 90 tablet, Rfl: 1    sertraline (Zoloft) 100 mg tablet, Take 1.5 tablets (150 mg) by mouth once daily., Disp: 135 tablet, Rfl: 0    Patient Active Problem List   Diagnosis    Chronic sinusitis    Depression with anxiety    Exercise-induced asthma ([facility])    GERD (gastroesophageal reflux disease)    Mixed hyperlipidemia    Primary hypertension    IBS (irritable bowel syndrome)    Obstructive sleep apnea syndrome    Seasonal allergies    Acne         Objective    Visit Vitals    Visit Vitals  Resp 18   Ht 1.854 m (6' 1\")   Wt 116 kg (255 lb)   BMI 33.64 kg/m²   Smoking Status Never   BSA 2.44 m²       Body mass index is " 33.64 kg/m².     Physical Exam     General - Not in acute distress and cooperative.  Build & Nutrition - Well developed  Posture - Normal  Gait - Normal  Mental Status - alert and oriented x 3    Head - Normocephalic    Neck - Thyroid normal size    Eyes - Bilateral - Sclera clear and lids pink without edema or mass.      Skin - Warm and dry with no rashes on visible skin    Lungs - Clear to auscultation and normal breathing effort    Cardiovascular - RRR and no murmurs, rubs or thrill.    Peripheral Vascular - Bilateral - no edema present    Neuropsychiatric - normal mood and affect        Assessment & Plan  Obesity (BMI 30.0-34.9)  The patient is a good candidate for a GLP-1 to help with weight loss.  We will start Zepbound 2.5 mg weekly.  We reviewed how to self inject and he will watch a video on YouTube at home prior to using the medication.  I told him to call us about 3 weeks after he starts the medication to let us know how he is doing.  If things are going well then I plan to increase him to 5 mg weekly in 1 month and we will send in the prescription after he calls us.  We will follow-up in 2 months.    Discussed need for weight loss and how weight affects other conditions.  I recommend to eat plenty of plant foods (such as whole-grain products, fruits, and vegetables) and a moderate amount of lean and low-fat, animal-based food (meat and dairy products).  When shopping, choose lean meats, fish, and poultry. I recommend to increase aerobic exercise gradually with an ultimate goal of 30 minutes 4 times per week (or more).    Orders:    tirzepatide, weight loss, (Zepbound) 2.5 mg/0.5 mL injection; Inject 2.5 mg under the skin every 7 days.    Acute hip pain, left  Patient is having relatively severe pain in his left hip as above.  I am going to refer him to orthopedics and my office will help him get an appointment in the next 1 to 2 days.  Orders:    Referral to Orthopaedic Surgery; Future    Primary  hypertension    Orders:    tirzepatide, weight loss, (Zepbound) 2.5 mg/0.5 mL injection; Inject 2.5 mg under the skin every 7 days.    Obstructive sleep apnea syndrome  Patient with a long history of obstructive sleep apnea and weight loss with tirzepatide will likely improve his sleep apnea over time.  Orders:    tirzepatide, weight loss, (Zepbound) 2.5 mg/0.5 mL injection; Inject 2.5 mg under the skin every 7 days.               No orders of the defined types were placed in this encounter.       No orders of the defined types were placed in this encounter.

## 2025-02-17 NOTE — ASSESSMENT & PLAN NOTE
The patient is a good candidate for a GLP-1 to help with weight loss.  We will start Zepbound 2.5 mg weekly.  We reviewed how to self inject and he will watch a video on YouTube at home prior to using the medication.  I told him to call us about 3 weeks after he starts the medication to let us know how he is doing.  If things are going well then I plan to increase him to 5 mg weekly in 1 month and we will send in the prescription after he calls us.  We will follow-up in 2 months.    Discussed need for weight loss and how weight affects other conditions.  I recommend to eat plenty of plant foods (such as whole-grain products, fruits, and vegetables) and a moderate amount of lean and low-fat, animal-based food (meat and dairy products).  When shopping, choose lean meats, fish, and poultry. I recommend to increase aerobic exercise gradually with an ultimate goal of 30 minutes 4 times per week (or more).    Orders:    tirzepatide, weight loss, (Zepbound) 2.5 mg/0.5 mL injection; Inject 2.5 mg under the skin every 7 days.

## 2025-02-18 ENCOUNTER — HOSPITAL ENCOUNTER (OUTPATIENT)
Dept: RADIOLOGY | Facility: CLINIC | Age: 50
Discharge: HOME | End: 2025-02-18
Payer: MEDICAID

## 2025-02-18 ENCOUNTER — OFFICE VISIT (OUTPATIENT)
Dept: ORTHOPEDIC SURGERY | Facility: CLINIC | Age: 50
End: 2025-02-18
Payer: MEDICAID

## 2025-02-18 DIAGNOSIS — M25.552 ACUTE HIP PAIN, LEFT: ICD-10-CM

## 2025-02-18 DIAGNOSIS — M25.552 PAIN OF LEFT HIP: ICD-10-CM

## 2025-02-18 DIAGNOSIS — M53.3 SI (SACROILIAC) JOINT DYSFUNCTION: ICD-10-CM

## 2025-02-18 DIAGNOSIS — M24.152 DEGENERATIVE TEAR OF ACETABULAR LABRUM OF LEFT HIP: Primary | ICD-10-CM

## 2025-02-18 PROCEDURE — 99214 OFFICE O/P EST MOD 30 MIN: CPT | Performed by: ORTHOPAEDIC SURGERY

## 2025-02-18 PROCEDURE — 73502 X-RAY EXAM HIP UNI 2-3 VIEWS: CPT | Mod: LT

## 2025-02-18 PROCEDURE — 73502 X-RAY EXAM HIP UNI 2-3 VIEWS: CPT | Mod: LEFT SIDE | Performed by: ORTHOPAEDIC SURGERY

## 2025-02-18 PROCEDURE — 1036F TOBACCO NON-USER: CPT | Performed by: ORTHOPAEDIC SURGERY

## 2025-02-18 PROCEDURE — 99204 OFFICE O/P NEW MOD 45 MIN: CPT | Performed by: ORTHOPAEDIC SURGERY

## 2025-02-18 RX ORDER — METHYLPREDNISOLONE 4 MG/1
TABLET ORAL
Qty: 21 TABLET | Refills: 0 | Status: SHIPPED | OUTPATIENT
Start: 2025-02-18

## 2025-02-18 RX ORDER — CYCLOBENZAPRINE HCL 10 MG
10 TABLET ORAL NIGHTLY PRN
Qty: 14 TABLET | Refills: 0 | Status: SHIPPED | OUTPATIENT
Start: 2025-02-18

## 2025-02-18 NOTE — PROGRESS NOTES
Subjective    Patient ID: Andre    Chief Complaint:   Chief Complaint   Patient presents with    Left Hip - New Patient Visit, Pain     49-year-old gentleman presented clinic today as a new patient to Dr. Owen for left hip pain.  He states that less than 2 weeks ago he was out west in [place] on a golf trip and felt a twinge in the left hip while swinging.  He was able to continue playing without much discomfort.  He and his group then went to a wine where he sat for about 2 hours and he went to stand up from sitting and felt extreme pain over the left groin and left buttock region.  He has pain that radiated down the left lower extremity.  No numbness tingling at this time.  No instability just difficulty with weightbearing.  He states that the pain is slightly improved with weightbearing but still having difficulty sitting.  No injury that he can recall.        Past medical history        The patient's past medical history, family history, social history, and review of systems were documented on the patient's medical intake form.  The medical intake form was reviewed and scanned into the electronic medical record for future use.  History is otherwise negative except as stated in the HPI.     Physical exam     General: Alert and oriented to place, person, and time.  No acute distress and breathing comfortably; pleasant and cooperative with the examination.  HEENT: Head is normocephalic and atraumatic.  Neck: Supple, no visible swelling.  Cardiovascular: Good perfusion to the affected extremity.  Lungs: No audible wheezing or labored breathing.  Abdomen: Nondistended     Extremity:  Left hip is neurovascular intact.  Good range of motion with passive range of motion no pain present.  He has groin pain with active range of motion and hip flexor tightness.  Tenderness palpation posterior lateral buttock region.  Negative straight leg raise test.  2+ pulses bilateral lower extremity.  Capillary refill  "within normal limits distally.  Compartments are soft.  No instability.     Diagnostics:  Please see dictated x-ray report     Procedures  [none   ]     Assessment:  Left-sided SI joint dysfunction     Treatment plan:  1.  We a long discussion regards to conservative treatment options for him.  2.  We will get him set up for outpatient physical therapy working on pelvic stability core strength program.  Prescription Medrol Dosepak and muscle relaxant sent to the pharmacy today.  3.  He will continue weightbearing activity as tolerated.  Follow-up with us approximately 5 weeks for reevaluation without x-ray.  If he still not feeling improvement at that time we may consider referral to orthopedic spine versus MRI left hip.  For complete plan and/or surgical details, please refer to Dr. Owen's portion of the split/shared dictation.  4.  All of the patient's questions were answered.     This note was prepared using voice recognition software.  The details of this note are correct and have been reviewed, and corrected to the best of my ability.  Some grammatical areas may persist related to the Dragon software     Hugo Brenner PA-C, [facility]  [facility] [place]     [phone]    In a face-to-face encounter performed today, I Saul Owen MD performed a history and physical examination, discussed pertinent diagnostic studies if indicated, and discussed diagnosis and management strategies with both the patient and the midlevel provider.  I reviewed the midlevel's note and agree with the documented findings and plan of care.  Greater than 50% of the evaluation and treatment decision was performed by the physician myself during today's visit.    New patient to me 49-year-old male here for evaluation of left-sided hip pain.  He states he never really had any hip trouble he was on a golf trip did not really feel any specific pain he just felt like something was \"weird in his hip during the " round but then after the round he had severe pain to the point where he could not even bear weight on his left hip.  It is maybe gotten a little bit better since then but he still feeling like the hip is giving him trouble he has pain in the groin pain in the posterior aspect of his hip does not have any associated numbness or tingling but the pain does sometimes radiate down towards his knee and sometimes into the posterior aspect of his hip.  On examination he has good muscle strength minimal pain with internal ex rotation flexion abduction external rotation maneuver is positive straight leg is testing is negative.  X-rays are obtained today see my dictated x-ray report.  Impression is strain sprain the left hip possible labral tear possible SI joint dysfunction plan is Medrol Dosepak, Flexeril, physical therapy, MRI left hip with gadolinium arthrogram.      Patient has severe impairment related to her presenting condition.  It is significantly impairing bodily function.  We did discuss surgical and nonsurgical options.    This note was prepared using voice recognition software.  The details of this note are correct and have been reviewed, and corrected to the best of my ability.  Some grammatical areas may persist related to the Dragon software    Saul Owen MD  Senior Attending Physician  [facility]    [phone]

## 2025-03-13 DIAGNOSIS — I10 PRIMARY HYPERTENSION: ICD-10-CM

## 2025-03-13 RX ORDER — LOSARTAN POTASSIUM 100 MG/1
100 TABLET ORAL DAILY
Qty: 90 TABLET | Refills: 0 | Status: SHIPPED | OUTPATIENT
Start: 2025-03-13

## 2025-03-18 ENCOUNTER — APPOINTMENT (OUTPATIENT)
Dept: RADIOLOGY | Facility: HOSPITAL | Age: 50
End: 2025-03-18
Payer: MEDICAID

## 2025-03-25 ENCOUNTER — HOSPITAL ENCOUNTER (OUTPATIENT)
Dept: RADIOLOGY | Facility: HOSPITAL | Age: 50
Discharge: HOME | End: 2025-03-25
Payer: MEDICAID

## 2025-03-25 ENCOUNTER — OFFICE VISIT (OUTPATIENT)
Dept: ORTHOPEDIC SURGERY | Facility: CLINIC | Age: 50
End: 2025-03-25
Payer: MEDICAID

## 2025-03-25 DIAGNOSIS — M24.152 DEGENERATIVE TEAR OF ACETABULAR LABRUM OF LEFT HIP: ICD-10-CM

## 2025-03-25 DIAGNOSIS — M53.3 SI (SACROILIAC) JOINT DYSFUNCTION: ICD-10-CM

## 2025-03-25 DIAGNOSIS — M87.052 AVASCULAR NECROSIS OF BONE OF LEFT HIP (MULTI): Primary | ICD-10-CM

## 2025-03-25 PROCEDURE — 1036F TOBACCO NON-USER: CPT | Performed by: PHYSICIAN ASSISTANT

## 2025-03-25 PROCEDURE — 77002 NEEDLE LOCALIZATION BY XRAY: CPT | Mod: LEFT SIDE | Performed by: RADIOLOGY

## 2025-03-25 PROCEDURE — 2550000001 HC RX 255 CONTRASTS: Performed by: ORTHOPAEDIC SURGERY

## 2025-03-25 PROCEDURE — 27093 INJECTION FOR HIP X-RAY: CPT | Mod: LT

## 2025-03-25 PROCEDURE — 99213 OFFICE O/P EST LOW 20 MIN: CPT | Performed by: PHYSICIAN ASSISTANT

## 2025-03-25 PROCEDURE — 20610 DRAIN/INJ JOINT/BURSA W/O US: CPT | Mod: LEFT SIDE | Performed by: RADIOLOGY

## 2025-03-25 PROCEDURE — 77002 NEEDLE LOCALIZATION BY XRAY: CPT | Mod: LT

## 2025-03-25 PROCEDURE — E0114 CRUTCH UNDERARM PAIR NO WOOD: HCPCS | Performed by: PHYSICIAN ASSISTANT

## 2025-03-25 PROCEDURE — A9575 INJ GADOTERATE MEGLUMI 0.1ML: HCPCS | Performed by: ORTHOPAEDIC SURGERY

## 2025-03-25 PROCEDURE — 96373 THER/PROPH/DIAG INJ IA: CPT | Performed by: ORTHOPAEDIC SURGERY

## 2025-03-25 RX ORDER — LIDOCAINE HYDROCHLORIDE 10 MG/ML
10 INJECTION, SOLUTION EPIDURAL; INFILTRATION; INTRACAUDAL; PERINEURAL ONCE
Status: CANCELLED | OUTPATIENT
Start: 2025-03-25 | End: 2025-03-25

## 2025-03-25 RX ORDER — GADOTERATE MEGLUMINE 376.9 MG/ML
1 INJECTION INTRAVENOUS
Status: COMPLETED | OUTPATIENT
Start: 2025-03-25 | End: 2025-03-25

## 2025-03-25 RX ADMIN — IOHEXOL 5 ML: 180 INJECTION INTRAVENOUS at 10:26

## 2025-03-25 RX ADMIN — GADOTERATE MEGLUMINE 1 ML: 376.9 INJECTION INTRAVENOUS at 10:15

## 2025-03-25 NOTE — PROGRESS NOTES
Subjective    Patient ID: Andre    Chief Complaint:   Chief Complaint   Patient presents with    Left Hip - Pain     MRI 3/25/2025     History of present illness    49-year-old gentleman presenting clinic today for follow-up evaluation of acute left hip pain.  He is here today to go over MRI results.  Continues to have acute groin pain and worsening symptoms.  The Medrol Dosepak that he was taken did not really do much in terms of overall symptomatology.  He continues to have difficulty with extended weightbearing activity.  He is very uncomfortable at today's visit.  He has mild pain over the adductor region that radiates distally.      Past medical , Surgical, Family and social history reviewed.      Physical exam  General: No acute distress and breathing comfortably.  Patient is pleasant and cooperative with the examination.    Extremity  Left hip is in irrationally intact.  The affected hip was examined and inspected and was tender to touch along the groin.  The hip joint occasionally displayed catching, locking, and mechanical symptoms.  The skin was intact without breakdown or open wounds.  There was some mild crepitus seen with hip internal and external range of motion without evidence of instability.  Inspection of the low back showed normal curvature and integrity of the skin.  Strength and stability of the low back muscles and ligaments were within normal limits.  There was a negative straight leg raise test with no foot drop, numbness, or tingling in both lower extremities.  Sensation, reflexes, and pulses in the foot and ankle are preserved.  There was no obvious effusion.  Range of motion showed flexion and internal and external rotation were all limited with pain.  The patient had the ability to bear weight, but with discomfort.  The patient's gait was antalgic secondary to the discomfort.  He has pain with rotational movements of the left groin    Diagnostics  [ none]  MR arthrogram hip  left    Result Date: 3/25/2025  Interpreted By:  Thais Joyner and Abuhamdeh Imran STUDY: MR arthrogram of the  left hip;  3/25/2025 11:04 am   INDICATION: Signs/Symptoms:pain.   ,M53.3 Sacrococcygeal disorders, not elsewhere classified,M24.152 Other articular cartilage disorders, left hip   COMPARISON: Left hip radiograph 02/18/2025   ACCESSION NUMBER(S): [id number]   ORDERING CLINICIAN: VIRGINIA AGUILAR   TECHNIQUE: MR imaging of the  left hip was obtained following the intra-articular administration of dilute gadolinium contrast material.   FINDINGS: TENDONS: The insertions of the gluteus medius and gluteus minimus tendons are intact. The insertion of the iliopsoas tendon is intact. The visualized hamstring tendon origin is intact.   JOINTS: Contrast material is identified within the hip joint. The hip joint articular cartilage is normal without focal defect. There is no evidence of significant arthrosis. There is fluid signal abnormality of the anterior acetabular labrum compatible with a tear. There is no evidence of avascular necrosis.   OSSEOUS STRUCTURES: There is subchondral serpiginous T1 hypointense signal abnormality with surrounding bone marrow edema of the superior femoral head without articular surface depression, with edema extending into the femoral neck region. There is no fracture.   SOFT TISSUES: A small amount of iatrogenic contrast material is identified within the anterior soft tissues related to arthrographic approach. No muscle atrophy or tear is seen.   INTERNAL ORGANS: Evaluation of the internal organs of the pelvis is limited on this small field of view study tailored for evaluation of the musculoskeletal system. No gross abnormality is seen in the pelvic organs.       1. Findings compatible with subacute osteonecrosis of the left femoral head without collapse of the articular surface with edema extending into the femoral neck. 2. Tear of the left anterior acetabular labrum.     I  personally reviewed the images/study and I agree with the findings as stated. This study was interpreted at [facility], [place], [place].   MACRO: None   Signed by: Thais Joyner 3/25/2025 12:56 PM Dictation workstation:   [id number]       Procedure  [ none]    Assessment  Left hip avascular necrosis  Acute acetabular labral tear left hip    Treatment plan  1.  At this time with a long discussion regards to continue conservative treatment for surgical intervention including a left total hip replacement in the future.  2.  He is in quite a bit of pain today so we will go ahead and get him scheduled ASAP for a left hip fluoroscopy guided steroid injection.  We will also put in a referral to Dr. Francois.  3.  He will follow-up with us if he needs anything in future such as left total hip replacement but we will check his candidacy first for possible arthroscopic surgery.  He was also given a set of crutches today to help with weightbearing pain that he has.  4.  All of the patient's questions were answered.    Orders Placed This Encounter    FL guided aspiration or injection large joint left    Referral to Orthopaedic Surgery       This note was prepared using voice recognition software.  The details of this note are correct and have been reviewed, and corrected to the best of my ability.  Some grammatical areas may persist related to the Dragon software    Hugo Brenner PA-C, [facility]  [facility]  [facility] [place]    [phone]

## 2025-04-02 ENCOUNTER — OFFICE VISIT (OUTPATIENT)
Dept: ORTHOPEDIC SURGERY | Facility: HOSPITAL | Age: 50
End: 2025-04-02
Payer: MEDICAID

## 2025-04-02 ENCOUNTER — HOSPITAL ENCOUNTER (OUTPATIENT)
Dept: RADIOLOGY | Facility: EXTERNAL LOCATION | Age: 50
Discharge: HOME | End: 2025-04-02

## 2025-04-02 DIAGNOSIS — M24.152 DEGENERATIVE TEAR OF ACETABULAR LABRUM OF LEFT HIP: ICD-10-CM

## 2025-04-02 DIAGNOSIS — M87.052 AVASCULAR NECROSIS OF BONE OF LEFT HIP (MULTI): ICD-10-CM

## 2025-04-02 DIAGNOSIS — M87.052 AVASCULAR NECROSIS OF BONE OF LEFT HIP (MULTI): Primary | ICD-10-CM

## 2025-04-02 PROCEDURE — 2500000004 HC RX 250 GENERAL PHARMACY W/ HCPCS (ALT 636 FOR OP/ED): Performed by: PHYSICIAN ASSISTANT

## 2025-04-02 PROCEDURE — 20611 DRAIN/INJ JOINT/BURSA W/US: CPT | Mod: LT | Performed by: PHYSICIAN ASSISTANT

## 2025-04-02 PROCEDURE — 99204 OFFICE O/P NEW MOD 45 MIN: CPT | Performed by: ORTHOPAEDIC SURGERY

## 2025-04-02 PROCEDURE — 99214 OFFICE O/P EST MOD 30 MIN: CPT | Performed by: ORTHOPAEDIC SURGERY

## 2025-04-02 RX ADMIN — LIDOCAINE HYDROCHLORIDE 4 ML: 10 INJECTION, SOLUTION INFILTRATION; PERINEURAL at 15:01

## 2025-04-02 RX ADMIN — METHYLPREDNISOLONE ACETATE 40 MG: 40 INJECTION, SUSPENSION INTRA-ARTICULAR; INTRALESIONAL; INTRAMUSCULAR; INTRASYNOVIAL; SOFT TISSUE at 15:01

## 2025-04-02 NOTE — PROGRESS NOTES
Patient ID: Andre Lazaro is a 49 y.o. male.    L Inj/Asp: L hip joint on 4/2/2025 3:01 PM  Indications: pain  Details: 20 G needle, ultrasound-guided anterior approach  Medications: 40 mg methylPREDNISolone acetate 40 mg/mL; 4 mL lidocaine 10 mg/mL (1 %)    After consent was obtained, the anterior left hip was prepped in a sterile fashion. Ultrasound guidance was used to help insure proper needle placement into the hip joint, decrease patient discomfort, and decrease collateral damage. The joint was aspirated and Depo 40 mg with lidocaine 4cc were injected without any complications. Ultrasound images were saved on an internal file for later referene. The patient tolerated the procedure well and the area was cleaned and bandaged.    Procedure, treatment alternatives, risks and benefits explained, specific risks discussed. Consent was given by the patient. Immediately prior to procedure a time out was called to verify the correct patient, procedure, equipment, support staff and site/side marked as required. Patient was prepped and draped in the usual sterile fashion.

## 2025-04-03 NOTE — PROGRESS NOTES
HPI  This is a pleasant 49 y.o. male here today for left hip pain.  Patient states that he has had pain ongoing for several months worse lately.  We talked about issues in his history that could be consistent with his diagnosis of avascular necrosis there is no evidence of high-dose steroid use in the past no evidence of clotting disorders though he does use alcohol.  He has had an MRI scan in the injection of the lidocaine helped him significantly with his discomfort.  He has seen another provider who told him that a hip replacement is likely necessary for this at some point        Past Medical History:   Diagnosis Date    Personal history of (healed) traumatic fracture 08/15/2016    History of fracture of phalanx of toe    Sore throat 02/27/2024    Unspecified injury of right foot, initial encounter 08/15/2016    Injury of toe on right foot, initial encounter       Past Surgical History:   Procedure Laterality Date    OTHER SURGICAL HISTORY  09/30/2019    Nasal septoplasty       Social History     Tobacco Use    Smoking status: Never    Smokeless tobacco: Never   Vaping Use    Vaping status: Never Used   Substance Use Topics    Alcohol use: Yes     Comment: moderately    Drug use: Never          ROS  Review of systems reviewed and pertinent positives mentioned in HPI.      PHYSICAL EXAM  There is not  pain with a resisted situp.    There is not abdominal distention or tenderness    The patient's range of motion reveals that they have 90° of hip flexion on the affected side.   ER 25 degrees.   IR 10 degrees  Hip extension to 10°   Abduction to 45°      The patient is 5 out of 5 strength with resisted hip AB, adduction, hamstring and quadriceps testing.    No pain over the hip flexor, ASIS.  No pain over the proximal hamstring, piriformis      Pain Provacation testing:    Positive impingement sign,with a painful arc from 12 to 3:00.  Negative Psoas impingement/Kaitlin test  Negative instability, Log roll.  Positive  subspine impingement test, which is pain with straight hip flexion.    Negative straight leg raise.  Negative circumduction clunk.      Peritrochanteric space examination:  Tenderness over the glen-trochanteric space - No  pain over the posterior trochanter - No      IMAGING  X-rays reviewed reveal no gross fracture or dislocation. and mild degenerative changes noted.  He is also noted to have avascular necrosis with some collapse    MRI reviewed reveals  severe avascular necrosis of the femoral head with evidence of collapse      ASSESSMENT/PLAN  This is a 49 y.o. male patient here today with significant hip pain secondary to Left  hip avascular necrosis with collapse    We will have them follow up with my physician assistant for an intra-articular hip injection for therapeutic purposes.         Sathya Francois MD

## 2025-04-07 ENCOUNTER — APPOINTMENT (OUTPATIENT)
Dept: PRIMARY CARE | Facility: CLINIC | Age: 50
End: 2025-04-07
Payer: MEDICAID

## 2025-04-07 RX ORDER — LIDOCAINE HYDROCHLORIDE 10 MG/ML
4 INJECTION, SOLUTION INFILTRATION; PERINEURAL
Status: COMPLETED | OUTPATIENT
Start: 2025-04-02 | End: 2025-04-02

## 2025-04-07 RX ORDER — METHYLPREDNISOLONE ACETATE 40 MG/ML
40 INJECTION, SUSPENSION INTRA-ARTICULAR; INTRALESIONAL; INTRAMUSCULAR; SOFT TISSUE
Status: COMPLETED | OUTPATIENT
Start: 2025-04-02 | End: 2025-04-02

## 2025-04-09 ENCOUNTER — TELEPHONE (OUTPATIENT)
Dept: ORTHOPEDIC SURGERY | Facility: CLINIC | Age: 50
End: 2025-04-09
Payer: MEDICAID

## 2025-04-09 NOTE — TELEPHONE ENCOUNTER
Patient lvm stating his got a injection by Dr. Francios into his Left hip about 8 days ago, he did not get any relief from the injection, having worse pain and trouble walking, he is asking what the next steps are, possible sx

## 2025-04-15 ENCOUNTER — OFFICE VISIT (OUTPATIENT)
Dept: ORTHOPEDIC SURGERY | Facility: CLINIC | Age: 50
End: 2025-04-15
Payer: MEDICAID

## 2025-04-15 DIAGNOSIS — M87.052 AVASCULAR NECROSIS OF BONE OF LEFT HIP (MULTI): Primary | ICD-10-CM

## 2025-04-15 PROCEDURE — 99214 OFFICE O/P EST MOD 30 MIN: CPT | Performed by: ORTHOPAEDIC SURGERY

## 2025-04-15 PROCEDURE — 1036F TOBACCO NON-USER: CPT | Performed by: ORTHOPAEDIC SURGERY

## 2025-04-15 RX ORDER — NABUMETONE 750 MG/1
750 TABLET, FILM COATED ORAL 2 TIMES DAILY
Qty: 60 TABLET | Refills: 0 | Status: SHIPPED | OUTPATIENT
Start: 2025-04-15 | End: 2025-05-15

## 2025-04-16 NOTE — PROGRESS NOTES
History of present illness    49-year-old male here for follow-up of his hip pain left hip continues to give him trouble he has hip arthritis with degenerative labral tearing went to see Dr. Francois who felt that he was not really a candidate for hip arthroscopy but did agree to set him up for a ultrasound-guided intra-articular hip injection patient states he had about 1 week of relief after the injection and now his pain is pretty much back to baseline he continues to have severe impairment of bodily function related to his left hip and has been refractory to conservative treatment pain is currently 8-9 out of 10.  Severely affecting his activities of daily living.      Past medical , Surgical, Family and social history reviewed.      Physical exam  General: No acute distress and breathing comfortably.  Patient is pleasant and cooperative with the examination.    Extremity  The affected hip was examined and inspected and was tender to touch along the groin and lateral bursal area.  The hip joint occasionally displayed catching, locking, and mechanical symptoms.  The skin was intact without breakdown or open wounds.  There was some mild crepitus seen with hip internal and external range of motion without evidence of instability.  Inspection of the low back showed normal curvature and integrity of the skin.  Strength and stability of the low back muscles and ligaments were within normal limits.  There was a negative straight leg raise test with no foot drop, numbness, or tingling in both lower extremities.  Sensation, reflexes, and pulses in the foot and ankle are preserved.  There was no obvious effusion.  Range of motion showed flexion and internal and external rotation were all limited with pain.  The patient had the ability to bear weight, but with discomfort.  The patient's gait was antalgic secondary to the discomfort.    Diagnostics      Imaging  No results found.    Cardiology, Vascular,  and Other Imaging  No other imaging results found for the past 7 days       Procedure  [ none]    Assessment  Left hip arthritis with degenerative labral tearing and evidence of avascular necrosis    Treatment plan  1.  The natural history of the condition and its associated treatment alternatives including surgical and nonsurgical options were discussed with the patient at length.  2.  Patient having severe impairment of bodily function related to left hip severely affecting his activities of daily living we discussed surgical and nonsurgical options after thoughtful consideration he would like to proceed with total hip replacement on the r left side.  The procedure its risk benefits treatment alternatives and postoperative course were discussed with him he understands and wishes to proceed he is going to set that up once he has a chance to discuss it with his family and his work.  He is anna call back once he has an idea of the timing of things.  In the meantime prescription for Relafen sent to pharmacy.  3.  Patient has failed all forms of conservative treatment.  The joint pain is significantly affecting quality of life and activities of daily living.    The patient has pain in the joint that is increased with activity and weightbearing, and walks with an antalgic gait.  The pain is interfering with activities of daily living.  Patient has limited range of motion and pain with passive range of motion.  X-rays demonstrate joint space narrowing, subchondral sclerosis and osteophyte formation.  Symptoms are not improving with medication, physical therapy or supportive device for a period of at least 3 months.  Weight loss and smoking cessation have been discussed with the patient.  Patient advised on when to cease smoking 6-8 weeks before the procedure.      Patient would like to go and schedule joint replacement surgery.  The procedure its risks, benefits, and treatment alternatives were discussed at length and  patient would like to proceed.  Patient is going to schedule the procedure at their earliest convenience and see me back for a preop visit just prior.  Preadmission testing, medical checkup, and insurance authorization will be performed in the meantime.  Patient understands a joint replacement surgery is a last resort, although a very successful operation results are not guaranteed.  4.  All of the patient's questions were answered.    Orders Placed This Encounter    nabumetone (Relafen) 750 mg tablet       This note was prepared using voice recognition software.  The details of this note are correct and have been reviewed, and corrected to the best of my ability.  Some grammatical areas may persist related to the Dragon software    Saul Owen MD  Senior Attending Physician  [facility]  [facility]    [phone]

## 2025-04-22 ENCOUNTER — APPOINTMENT (OUTPATIENT)
Dept: PRIMARY CARE | Facility: CLINIC | Age: 50
End: 2025-04-22
Payer: MEDICAID

## 2025-04-30 PROBLEM — M16.12 UNILATERAL PRIMARY OSTEOARTHRITIS, LEFT HIP: Status: ACTIVE | Noted: 2025-04-30

## 2025-05-01 DIAGNOSIS — I10 PRIMARY HYPERTENSION: ICD-10-CM

## 2025-05-01 RX ORDER — ATENOLOL 25 MG/1
25 TABLET ORAL DAILY
Qty: 30 TABLET | Refills: 1 | Status: SHIPPED | OUTPATIENT
Start: 2025-05-01

## 2025-05-30 DIAGNOSIS — M16.12 UNILATERAL PRIMARY OSTEOARTHRITIS, LEFT HIP: ICD-10-CM

## 2025-06-06 ENCOUNTER — TRANSCRIBE ORDERS (OUTPATIENT)
Dept: ORTHOPEDIC SURGERY | Facility: CLINIC | Age: 50
End: 2025-06-06
Payer: MEDICAID

## 2025-06-06 DIAGNOSIS — M16.12 UNILATERAL PRIMARY OSTEOARTHRITIS, LEFT HIP: Primary | ICD-10-CM

## 2025-06-07 DIAGNOSIS — M16.12 UNILATERAL PRIMARY OSTEOARTHRITIS, LEFT HIP: ICD-10-CM

## 2025-06-17 ENCOUNTER — APPOINTMENT (OUTPATIENT)
Dept: PRIMARY CARE | Facility: CLINIC | Age: 50
End: 2025-06-17
Payer: MEDICAID

## 2025-06-17 VITALS
BODY MASS INDEX: 33.53 KG/M2 | RESPIRATION RATE: 16 BRPM | TEMPERATURE: 97 F | DIASTOLIC BLOOD PRESSURE: 84 MMHG | WEIGHT: 253 LBS | HEIGHT: 73 IN | SYSTOLIC BLOOD PRESSURE: 136 MMHG | HEART RATE: 74 BPM

## 2025-06-17 DIAGNOSIS — Z01.818 PREOPERATIVE CLEARANCE: Primary | ICD-10-CM

## 2025-06-17 PROCEDURE — 99214 OFFICE O/P EST MOD 30 MIN: CPT | Performed by: FAMILY MEDICINE

## 2025-06-17 PROCEDURE — 3008F BODY MASS INDEX DOCD: CPT | Performed by: FAMILY MEDICINE

## 2025-06-17 PROCEDURE — 3079F DIAST BP 80-89 MM HG: CPT | Performed by: FAMILY MEDICINE

## 2025-06-17 PROCEDURE — 3075F SYST BP GE 130 - 139MM HG: CPT | Performed by: FAMILY MEDICINE

## 2025-06-17 ASSESSMENT — PATIENT HEALTH QUESTIONNAIRE - PHQ9
2. FEELING DOWN, DEPRESSED OR HOPELESS: NOT AT ALL
SUM OF ALL RESPONSES TO PHQ9 QUESTIONS 1 AND 2: 0
1. LITTLE INTEREST OR PLEASURE IN DOING THINGS: NOT AT ALL

## 2025-06-17 NOTE — PROGRESS NOTES
"Andre Lazaro is a 50 y.o. male here today for   Chief Complaint   Patient presents with    Pre-op Exam     L hip replacement on 7/3         HPI     Patient scheduled for left total hip replacement with Dr. Owen on 7/3/2025 at [facility].  He will go for preadmission testing on 6/19/2025.  Had cortisone injection and it helped briefly.      He has a past medical history of hypertension, obstructive sleep apnea, mild exercise-induced asthma, depression with anxiety.  His obstructive sleep apnea is not currently being treated because he says he cannot tolerate APAP masks.      No PMH of thrombus, bleeding disorder, strokes, seizures.      P Surg Hx:  Tonsils, testicular cancer, septoplasty.    No anesthesia complications.      FH :  no anesthesia complications.      Soc Hx:  Vapes a little, ETOH - 3 beers per day.  No drug use.          Current Outpatient Medications   Medication Instructions    atenolol (TENORMIN) 25 mg, oral, Daily    losartan (COZAAR) 100 mg, oral, Daily    sertraline (ZOLOFT) 150 mg, oral, Daily       Patient Active Problem List    Diagnosis Date Noted    Depression with anxiety 02/27/2024    GERD (gastroesophageal reflux disease) 02/27/2024    Mixed hyperlipidemia 02/27/2024    Primary hypertension 02/27/2024    Obstructive sleep apnea syndrome 01/29/2019    Obesity (BMI 30.0-34.9) 02/17/2025    Chronic sinusitis 02/27/2024    Exercise-induced asthma 02/27/2024    IBS (irritable bowel syndrome) 02/27/2024    Seasonal allergies 02/27/2024    Acne 02/27/2024    Unilateral primary osteoarthritis, left hip 04/30/2025         Objective      Visit Vitals    Visit Vitals  /84   Pulse 74   Temp 36.1 °C (97 °F)   Resp 16   Ht 1.854 m (6' 1\")   Wt 115 kg (253 lb)   BMI 33.38 kg/m²   Smoking Status Never   BSA 2.43 m²       Body mass index is 33.38 kg/m².     Physical Exam     General - Not in acute distress and cooperative.  Build & Nutrition - Well developed  Posture - Normal  Gait - Normal  Mental " Status - alert and oriented x 3    Head - Normocephalic    Neck - Thyroid normal size    Eyes - Bilateral - Sclera clear and lids pink without edema or mass.      Skin - Warm and dry with no rashes on visible skin    Lungs - Clear to auscultation and normal breathing effort    Cardiovascular - RRR and no murmurs, rubs or thrill.    Peripheral Vascular - Bilateral - no edema present    Neuropsychiatric - normal mood and affect        Assessment & Plan  Preoperative clearance  Patient is considered low risk for the upcoming left total hip replacement with Dr. Owen.  He will go for preadmission testing in the near future but he is otherwise cleared for surgery with general anesthesia.  I strongly recommend that he stop vaping and abstain from any alcohol use.  He is scheduled for surgery on July 3, 2025 at [facility].                  No orders of the defined types were placed in this encounter.       No orders of the defined types were placed in this encounter.

## 2025-06-19 LAB
ALBUMIN SERPL BCP-MCNC: 4.4 G/DL (ref 3.4–5)
ALP SERPL-CCNC: 70 U/L (ref 33–120)
ALT SERPL W P-5'-P-CCNC: 20 U/L (ref 10–52)
ANION GAP SERPL CALC-SCNC: 13 MMOL/L (ref 10–20)
AST SERPL W P-5'-P-CCNC: 17 U/L (ref 9–39)
BASOPHILS # BLD AUTO: 0.02 X10*3/UL (ref 0–0.1)
BASOPHILS NFR BLD AUTO: 0.4 %
BILIRUB SERPL-MCNC: 0.5 MG/DL (ref 0–1.2)
BUN SERPL-MCNC: 20 MG/DL (ref 6–23)
CALCIUM SERPL-MCNC: 9.4 MG/DL (ref 8.6–10.3)
CHLORIDE SERPL-SCNC: 101 MMOL/L (ref 98–107)
CO2 SERPL-SCNC: 27 MMOL/L (ref 21–32)
CREAT SERPL-MCNC: 0.96 MG/DL (ref 0.5–1.3)
EGFRCR SERPLBLD CKD-EPI 2021: >90 ML/MIN/1.73M*2
EOSINOPHIL # BLD AUTO: 0.14 X10*3/UL (ref 0–0.7)
EOSINOPHIL NFR BLD AUTO: 2.8 %
ERYTHROCYTE [DISTWIDTH] IN BLOOD BY AUTOMATED COUNT: 12.3 % (ref 11.5–14.5)
EST. AVERAGE GLUCOSE BLD GHB EST-MCNC: 100 MG/DL
GLUCOSE SERPL-MCNC: 92 MG/DL (ref 74–99)
HBA1C MFR BLD: 5.1 % (ref ?–5.7)
HCT VFR BLD AUTO: 46.2 % (ref 41–52)
HGB BLD-MCNC: 15.4 G/DL (ref 13.5–17.5)
IMM GRANULOCYTES # BLD AUTO: 0.02 X10*3/UL (ref 0–0.7)
IMM GRANULOCYTES NFR BLD AUTO: 0.4 % (ref 0–0.9)
INR PPP: 1 (ref 0.9–1.1)
LYMPHOCYTES # BLD AUTO: 1.06 X10*3/UL (ref 1.2–4.8)
LYMPHOCYTES NFR BLD AUTO: 21 %
MCH RBC QN AUTO: 32.2 PG (ref 26–34)
MCHC RBC AUTO-ENTMCNC: 33.3 G/DL (ref 32–36)
MCV RBC AUTO: 97 FL (ref 80–100)
MONOCYTES # BLD AUTO: 0.34 X10*3/UL (ref 0.1–1)
MONOCYTES NFR BLD AUTO: 6.7 %
NEUTROPHILS # BLD AUTO: 3.46 X10*3/UL (ref 1.2–7.7)
NEUTROPHILS NFR BLD AUTO: 68.7 %
NRBC BLD-RTO: 0 /100 WBCS (ref 0–0)
PLATELET # BLD AUTO: 219 X10*3/UL (ref 150–450)
POTASSIUM SERPL-SCNC: 4.7 MMOL/L (ref 3.5–5.3)
PROT SERPL-MCNC: 6.8 G/DL (ref 6.4–8.2)
PROTHROMBIN TIME: 11.4 SECONDS (ref 9.8–12.4)
RBC # BLD AUTO: 4.78 X10*6/UL (ref 4.5–5.9)
SODIUM SERPL-SCNC: 136 MMOL/L (ref 136–145)
WBC # BLD AUTO: 5 X10*3/UL (ref 4.4–11.3)

## 2025-06-21 LAB — STAPHYLOCOCCUS SPEC CULT: NORMAL
